# Patient Record
Sex: FEMALE | NOT HISPANIC OR LATINO | URBAN - METROPOLITAN AREA
[De-identification: names, ages, dates, MRNs, and addresses within clinical notes are randomized per-mention and may not be internally consistent; named-entity substitution may affect disease eponyms.]

---

## 2020-06-01 ENCOUNTER — OFFICE VISIT (OUTPATIENT)
Dept: URBAN - METROPOLITAN AREA CLINIC 71 | Facility: CLINIC | Age: 75
End: 2020-06-01
Payer: MEDICARE

## 2020-06-01 DIAGNOSIS — H00.11 CHALAZION RIGHT UPPER EYELID: Primary | ICD-10-CM

## 2020-06-01 PROCEDURE — 92002 INTRM OPH EXAM NEW PATIENT: CPT | Performed by: OPHTHALMOLOGY

## 2020-06-01 RX ORDER — VALSARTAN 320 MG/1
320 MG TABLET ORAL
Qty: 0 | Refills: 0 | Status: ACTIVE
Start: 2020-06-01

## 2020-06-01 RX ORDER — AMLODIPINE BESYLATE 10 MG/1
10 MG TABLET ORAL
Qty: 0 | Refills: 0 | Status: ACTIVE
Start: 2020-06-01

## 2020-06-01 RX ORDER — EZETIMIBE 10 MG/1
10 MG TABLET ORAL
Qty: 0 | Refills: 0 | Status: ACTIVE
Start: 2020-06-01

## 2020-06-01 RX ORDER — METHOTREXATE SODIUM 2.5 MG/1
2.5 MG TABLET ORAL
Qty: 0 | Refills: 0 | Status: ACTIVE
Start: 2020-06-01

## 2020-06-01 RX ORDER — PANTOPRAZOLE SODIUM 40 MG/1
40 MG TABLET, DELAYED RELEASE ORAL
Qty: 0 | Refills: 0 | Status: ACTIVE
Start: 2020-06-01

## 2020-06-01 RX ORDER — SULFASALAZINE 500 MG/1
500 MG TABLET ORAL
Qty: 0 | Refills: 0 | Status: ACTIVE
Start: 2020-06-01

## 2020-06-01 RX ORDER — PREDNISONE 10 MG/1
10 MG TABLET ORAL
Qty: 0 | Refills: 0 | Status: ACTIVE
Start: 2020-06-01

## 2020-06-01 RX ORDER — TOBRAMYCIN AND DEXAMETHASONE 3; 1 MG/ML; MG/ML
SUSPENSION/ DROPS OPHTHALMIC
Qty: 1 | Refills: 0 | Status: ACTIVE
Start: 2020-06-01

## 2020-06-01 ASSESSMENT — INTRAOCULAR PRESSURE
OD: 15
OS: 15

## 2020-06-01 NOTE — IMPRESSION/PLAN
Impression: Chalazion right upper eyelid: H00.11.

-has been using warm compresses for the right eye.
-Start Tobradex 3x for 1 week  Plan: Discussed diagnosis in detail with patient. Discussed treatment options with patient. Discussed risks and benefits and patient understands. Will continue to observe condition and or symptoms. Will continue to monitor IOP. Patient instructed to call if condition gets worse. Call if [address] worsens.